# Patient Record
Sex: FEMALE | Race: WHITE | NOT HISPANIC OR LATINO | URBAN - METROPOLITAN AREA
[De-identification: names, ages, dates, MRNs, and addresses within clinical notes are randomized per-mention and may not be internally consistent; named-entity substitution may affect disease eponyms.]

---

## 2022-01-05 ENCOUNTER — APPOINTMENT (OUTPATIENT)
Dept: URBAN - METROPOLITAN AREA CLINIC 211 | Age: 57
Setting detail: DERMATOLOGY
End: 2022-01-05

## 2022-01-05 ENCOUNTER — APPOINTMENT (OUTPATIENT)
Dept: URBAN - METROPOLITAN AREA CLINIC 211 | Age: 57
Setting detail: DERMATOLOGY
End: 2022-01-06

## 2022-01-05 DIAGNOSIS — L98.8 OTHER SPECIFIED DISORDERS OF THE SKIN AND SUBCUTANEOUS TISSUE: ICD-10-CM

## 2022-01-05 DIAGNOSIS — Z41.9 ENCOUNTER FOR PROCEDURE FOR PURPOSES OTHER THAN REMEDYING HEALTH STATE, UNSPECIFIED: ICD-10-CM

## 2022-01-05 PROCEDURE — OTHER OTHER (COSMETIC): OTHER

## 2022-01-05 PROCEDURE — OTHER BOTOX: OTHER

## 2022-01-05 PROCEDURE — OTHER MIPS QUALITY: OTHER

## 2022-01-05 PROCEDURE — OTHER DEFER: OTHER

## 2022-01-05 PROCEDURE — OTHER MEDICAL CONSULTATION: DYNAMIC RHYTIDES: OTHER

## 2022-01-05 ASSESSMENT — LOCATION DETAILED DESCRIPTION DERM
LOCATION DETAILED: LEFT CENTRAL OCCIPITAL SCALP
LOCATION DETAILED: GLABELLA

## 2022-01-05 ASSESSMENT — LOCATION ZONE DERM
LOCATION ZONE: FACE
LOCATION ZONE: SCALP

## 2022-01-05 ASSESSMENT — LOCATION SIMPLE DESCRIPTION DERM
LOCATION SIMPLE: GLABELLA
LOCATION SIMPLE: SCALP

## 2022-01-05 NOTE — HPI: OTHER
Condition:: Cosmetic
Please Describe Your Condition:: Pt presents in the office to be established for a consultation for treatment options for wrinkles, and loss of volume in the skin.

## 2022-01-05 NOTE — HPI: OTHER
Condition:: Cosmetic
Please Describe Your Condition:: Pt is interested in treatment options to address the texture and her heavy brow and loss of volume in the upper lip.  Pt has previously had facials, dysport. Pt uses the [employer] line of products and she does not wear any make up.  Medications, allergies and medical hx were reviewed.

## 2022-01-05 NOTE — PROCEDURE: MIPS QUALITY
Quality 110: Preventive Care And Screening: Influenza Immunization: Influenza Immunization previously received during influenza season
Quality 431: Preventive Care And Screening: Unhealthy Alcohol Use - Screening: Patient screened for unhealthy alcohol use using a single question and scores less than 2 times per year
Quality 130: Documentation Of Current Medications In The Medical Record: Current Medications Documented
Quality 226: Preventive Care And Screening: Tobacco Use: Screening And Cessation Intervention: Patient screened for tobacco use and is an ex/non-smoker
Quality 431: Preventive Care And Screening: Unhealthy Alcohol Use - Screening: Patient not identified as an unhealthy alcohol user when screened for unhealthy alcohol use using a systematic screening method
Detail Level: Detailed

## 2022-01-05 NOTE — PROCEDURE: DEFER
Detail Level: Detailed
Instructions (Optional): Patient has h/o BCC and MM and states she is up to date with [facility].  She just moved to the area and went to another practice for [facility] but cannot recall the name of the practice.
Introduction Text (Please End With A Colon): No medical Derm concerns:
Other Procedure: Consultation with Keny[facility]

## 2022-01-05 NOTE — PROCEDURE: OTHER (COSMETIC)
Other (Free Text): Cosmetic Consultation\\nPE:loss of volume in the upper lip, loss of volume in the cheek area and oral PJS, and down turn of her lips, hooded upper brow, light sks on the lateral face, dynamic and static rhytides on the forehead and glabella, clogged pores on the lateral face, normal skin not dry or oily\\n\\nPLAN:\\n\\n1.  Rec HA filler for the upper lip, cheeks and PJS and corners of the mouth.  The procedure was reviewed in detail, r&b and post treatment expectations.  CQ was given.\\n\\n2.  Rec Botox/Xeomin/Jeuveau for the forehead and the glabella.  The procedure was reviewed in detail, r&b and post treatment expectations.  CQ was given.\\n\\n3. Spoke of LN2 to address the sks and also rec the ZO Sjin Brightening Retinol to apply to full face qhs.  The product was reviewed in detail, r&b and rationale was provided and would help with the clogged pores.  Informational handout was given for the ZO Retinol Skin Brightener.  \\n\\n4.  Rec Dr. Vivienne Bahena for an upper lid lift to address the heavier brow.  Contact information was given to the pt.\\n\\n5.  Rec regular facials.  Informational handout was given with a cq.\\n\\nNOTE:\\n\\Sadia indications, treatment expectations (including management of any possible irritations), protocols, risks and benefits, pre/post care are reviewed.  Patient understands that multiple treatments may be necessary for optimum results and that on going maintenance with at-home products and additional office visits or treatments may be needed to enhance and extend the desired results.\\n\\Sadia questions were addressed.\\n\\nRTC-
Detail Level: Zone

## 2022-04-26 ENCOUNTER — APPOINTMENT (OUTPATIENT)
Dept: URBAN - METROPOLITAN AREA CLINIC 220 | Age: 57
Setting detail: DERMATOLOGY
End: 2022-04-26

## 2022-04-26 DIAGNOSIS — L98.8 OTHER SPECIFIED DISORDERS OF THE SKIN AND SUBCUTANEOUS TISSUE: ICD-10-CM

## 2022-04-26 PROCEDURE — OTHER PHOTO-DOCUMENTATION: OTHER

## 2022-04-26 PROCEDURE — OTHER BOTOX: OTHER

## 2022-04-26 PROCEDURE — OTHER COUNSELING: OTHER

## 2022-04-26 PROCEDURE — OTHER MIPS QUALITY: OTHER

## 2022-04-26 ASSESSMENT — LOCATION ZONE DERM: LOCATION ZONE: FACE

## 2022-04-26 ASSESSMENT — LOCATION DETAILED DESCRIPTION DERM: LOCATION DETAILED: LEFT MEDIAL FOREHEAD

## 2022-04-26 ASSESSMENT — LOCATION SIMPLE DESCRIPTION DERM: LOCATION SIMPLE: LEFT FOREHEAD

## 2022-04-26 NOTE — PROCEDURE: MIPS QUALITY
Quality 110: Preventive Care And Screening: Influenza Immunization: Influenza Immunization previously received during influenza season
Quality 431: Preventive Care And Screening: Unhealthy Alcohol Use - Screening: Patient screened for unhealthy alcohol use using a single question and scores less than 2 times per year
Detail Level: Detailed
Quality 226: Preventive Care And Screening: Tobacco Use: Screening And Cessation Intervention: Patient screened for tobacco use and is an ex/non-smoker
Quality 431: Preventive Care And Screening: Unhealthy Alcohol Use - Screening: Patient not identified as an unhealthy alcohol user when screened for unhealthy alcohol use using a systematic screening method
Quality 130: Documentation Of Current Medications In The Medical Record: Current Medications Documented

## 2022-10-25 ENCOUNTER — APPOINTMENT (OUTPATIENT)
Dept: URBAN - METROPOLITAN AREA CLINIC 211 | Age: 57
Setting detail: DERMATOLOGY
End: 2022-10-25

## 2022-10-25 DIAGNOSIS — L98.8 OTHER SPECIFIED DISORDERS OF THE SKIN AND SUBCUTANEOUS TISSUE: ICD-10-CM

## 2022-10-25 PROCEDURE — OTHER MIPS QUALITY: OTHER

## 2022-10-25 PROCEDURE — OTHER COSMETIC CONSULTATION: FILLERS: OTHER

## 2022-10-25 PROCEDURE — OTHER JEUVEAU: OTHER

## 2022-10-25 PROCEDURE — OTHER COUNSELING: OTHER

## 2022-10-25 PROCEDURE — OTHER OTHER: OTHER

## 2022-10-25 NOTE — PROCEDURE: MIPS QUALITY
Quality 130: Documentation Of Current Medications In The Medical Record: Current Medications Documented
Quality 431: Preventive Care And Screening: Unhealthy Alcohol Use - Screening: Patient not identified as an unhealthy alcohol user when screened for unhealthy alcohol use using a systematic screening method
Quality 110: Preventive Care And Screening: Influenza Immunization: Influenza Immunization previously received during influenza season
Quality 402: Tobacco Use And Help With Quitting Among Adolescents: Patient screened for tobacco and is an ex-smoker
Detail Level: Detailed
Quality 226: Preventive Care And Screening: Tobacco Use: Screening And Cessation Intervention: Patient screened for tobacco use and is an ex/non-smoker

## 2022-10-25 NOTE — PROCEDURE: JEUVEAU
Show Right And Left Brow Units: No
Show Anterior Platysmal Band Units: Yes
Masseter Units: 0
Consent: Written consent obtained. Risks include but not limited to lid/brow ptosis, bruising, swelling, diplopia, temporary effect, incomplete chemical denervation.
Detail Level: Detailed
Post-Care Instructions: Patient instructed to not lie down for 4 hours and limit physical activity for 24 hours.
Dilution (U/0.1 Cc): 4
Show Inventory Tab: Show
Additional Area 1 Location: glabella and crows feet
Additional Area 1 Units: 30

## 2022-10-25 NOTE — HPI: COSMETIC (BOTOX)
Have You Had Botox Before?: has had botox
Additional History: Patient would like Jeuveau today \\n\\n
When Was Your Last Botox Treatment?: 4/22

## 2022-10-25 NOTE — PROCEDURE: OTHER
Other (Free Text): 1.  Rec HA filler for her upper lip.  The procedure was reviewed in detail, r&b and post treatment expectations.  Rec starting with 1 syringe.  Patient was quoted $650 - $700 per syringe.  Discussed that patient will come in 30 minutes prior for topical numbing.
Render Risk Assessment In Note?: no
Note Text (......Xxx Chief Complaint.): This diagnosis correlates with the
Detail Level: Simple

## 2022-12-07 ENCOUNTER — APPOINTMENT (OUTPATIENT)
Dept: URBAN - METROPOLITAN AREA CLINIC 211 | Age: 57
Setting detail: DERMATOLOGY
End: 2022-12-07

## 2022-12-07 DIAGNOSIS — Z41.9 ENCOUNTER FOR PROCEDURE FOR PURPOSES OTHER THAN REMEDYING HEALTH STATE, UNSPECIFIED: ICD-10-CM

## 2022-12-07 PROCEDURE — OTHER MIPS QUALITY: OTHER

## 2022-12-07 PROCEDURE — OTHER OTHER: OTHER

## 2022-12-07 NOTE — PROCEDURE: OTHER
Note Text (......Xxx Chief Complaint.): This diagnosis correlates with the
Render Risk Assessment In Note?: no
Detail Level: Zone
Other (Free Text): Cosmetic Consultation\\nPE:\\n\\nPLAN:\\n\\n1.\\n\\n2.\\n\\n3.\\n\\nNOTE:\\n\\Sadia indications, treatment expectations (including management of any possible irritations), protocols, risks and benefits, pre/post care are reviewed.  Patient understands that multiple treatments may be necessary for optimum results and that on going maintenance with at-home products and additional office visits or treatments may be needed to enhance and extend the desired results.\\n\\Sadia questions were addressed.\\n\\nRTC-

## 2022-12-07 NOTE — HPI: OTHER
Condition:: Cosmetic
Please Describe Your Condition:: is an established patient who is being seen for a chief complaint of Cosmetic . Consultation for  Boot camp treatment, \"bumps\" on her face, and sun damage on her chest and neck. \\nShe is using OTC skin care stating no retinol use. 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

## 2024-10-21 NOTE — PROCEDURE: BOTOX
----- Message from Dr. Guanaco Perkins DO sent at 10/20/2024  9:46 PM EDT -----  Repeat in 1 week.   Dilution (U/0.1 Cc): 4